# Patient Record
Sex: FEMALE | Race: WHITE | NOT HISPANIC OR LATINO | Employment: OTHER | URBAN - METROPOLITAN AREA
[De-identification: names, ages, dates, MRNs, and addresses within clinical notes are randomized per-mention and may not be internally consistent; named-entity substitution may affect disease eponyms.]

---

## 2019-10-09 ENCOUNTER — OFFICE VISIT (OUTPATIENT)
Dept: FAMILY MEDICINE CLINIC | Facility: CLINIC | Age: 74
End: 2019-10-09

## 2019-10-09 VITALS
TEMPERATURE: 98.1 F | WEIGHT: 134 LBS | DIASTOLIC BLOOD PRESSURE: 70 MMHG | HEART RATE: 110 BPM | BODY MASS INDEX: 27.01 KG/M2 | HEIGHT: 59 IN | SYSTOLIC BLOOD PRESSURE: 130 MMHG | OXYGEN SATURATION: 92 %

## 2019-10-09 DIAGNOSIS — Z00.00 ANNUAL VISIT FOR GENERAL ADULT MEDICAL EXAMINATION WITHOUT ABNORMAL FINDINGS: Primary | ICD-10-CM

## 2019-10-09 LAB
ALBUMIN SERPL-MCNC: 4.2 G/DL (ref 3.5–5.2)
ALBUMIN/GLOB SERPL: 1.4 G/DL
ALP SERPL-CCNC: 87 U/L (ref 39–117)
ALT SERPL-CCNC: 11 U/L (ref 1–33)
AST SERPL-CCNC: 18 U/L (ref 1–32)
BASOPHILS # BLD AUTO: 0.02 10*3/MM3 (ref 0–0.2)
BASOPHILS NFR BLD AUTO: 0.2 % (ref 0–1.5)
BILIRUB SERPL-MCNC: 0.3 MG/DL (ref 0.2–1.2)
BUN SERPL-MCNC: 9 MG/DL (ref 8–23)
BUN/CREAT SERPL: 14.3 (ref 7–25)
CALCIUM SERPL-MCNC: 9.7 MG/DL (ref 8.6–10.5)
CHLORIDE SERPL-SCNC: 96 MMOL/L (ref 98–107)
CHOLEST SERPL-MCNC: 234 MG/DL (ref 0–200)
CO2 SERPL-SCNC: 33.7 MMOL/L (ref 22–29)
CREAT SERPL-MCNC: 0.63 MG/DL (ref 0.57–1)
EOSINOPHIL # BLD AUTO: 0.11 10*3/MM3 (ref 0–0.4)
EOSINOPHIL NFR BLD AUTO: 1.4 % (ref 0.3–6.2)
ERYTHROCYTE [DISTWIDTH] IN BLOOD BY AUTOMATED COUNT: 12.3 % (ref 12.3–15.4)
GLOBULIN SER CALC-MCNC: 3.1 GM/DL
GLUCOSE SERPL-MCNC: 91 MG/DL (ref 65–99)
HBA1C MFR BLD: 5.6 % (ref 4.8–5.6)
HCT VFR BLD AUTO: 41.2 % (ref 34–46.6)
HDLC SERPL-MCNC: 72 MG/DL (ref 40–60)
HGB BLD-MCNC: 13.8 G/DL (ref 12–15.9)
IMM GRANULOCYTES # BLD AUTO: 0.02 10*3/MM3 (ref 0–0.05)
IMM GRANULOCYTES NFR BLD AUTO: 0.2 % (ref 0–0.5)
LDLC SERPL CALC-MCNC: 141 MG/DL (ref 0–100)
LYMPHOCYTES # BLD AUTO: 1.66 10*3/MM3 (ref 0.7–3.1)
LYMPHOCYTES NFR BLD AUTO: 20.7 % (ref 19.6–45.3)
MCH RBC QN AUTO: 29.2 PG (ref 26.6–33)
MCHC RBC AUTO-ENTMCNC: 33.5 G/DL (ref 31.5–35.7)
MCV RBC AUTO: 87.1 FL (ref 79–97)
MONOCYTES # BLD AUTO: 0.6 10*3/MM3 (ref 0.1–0.9)
MONOCYTES NFR BLD AUTO: 7.5 % (ref 5–12)
NEUTROPHILS # BLD AUTO: 5.6 10*3/MM3 (ref 1.7–7)
NEUTROPHILS NFR BLD AUTO: 70 % (ref 42.7–76)
NRBC BLD AUTO-RTO: 0 /100 WBC (ref 0–0.2)
PLATELET # BLD AUTO: 260 10*3/MM3 (ref 140–450)
POTASSIUM SERPL-SCNC: 4.9 MMOL/L (ref 3.5–5.2)
PROT SERPL-MCNC: 7.3 G/DL (ref 6–8.5)
RBC # BLD AUTO: 4.73 10*6/MM3 (ref 3.77–5.28)
SODIUM SERPL-SCNC: 141 MMOL/L (ref 136–145)
TRIGL SERPL-MCNC: 103 MG/DL (ref 0–150)
TSH SERPL DL<=0.005 MIU/L-ACNC: 2.22 UIU/ML (ref 0.27–4.2)
VLDLC SERPL CALC-MCNC: 20.6 MG/DL
WBC # BLD AUTO: 8.01 10*3/MM3 (ref 3.4–10.8)

## 2019-10-09 PROCEDURE — 99387 INIT PM E/M NEW PAT 65+ YRS: CPT | Performed by: NURSE PRACTITIONER

## 2019-10-09 NOTE — PROGRESS NOTES
"Jyothi Malone is a 74 y.o. female who presents with   Chief Complaint   Patient presents with   • Establish Care   • Annual Exam       PCP: last visit about a year ago. No medical history.  UTI  In the past.         Review of Systems   Constitutional: Negative for appetite change, diaphoresis, fatigue and unexpected weight change.   Eyes: Negative for pain and visual disturbance.   Respiratory: Negative for cough and shortness of breath.    Cardiovascular: Negative for chest pain.   Gastrointestinal: Negative for abdominal pain, nausea and vomiting.   Musculoskeletal: Negative for arthralgias and myalgias.   Neurological: Negative for dizziness and headaches.   All other systems reviewed and are negative.        The following portions of the patient's history were reviewed and updated as appropriate: allergies, current medications, past family history, past medical history, past social history, past surgical history and problem list.      There are no active problems to display for this patient.      No current outpatient medications on file prior to visit.     No current facility-administered medications on file prior to visit.        Objective     /70 (BP Location: Left arm, Patient Position: Sitting, Cuff Size: Adult)   Pulse 110   Temp 98.1 °F (36.7 °C) (Oral)   Ht 149.9 cm (59\")   Wt 60.8 kg (134 lb)   SpO2 92%   BMI 27.06 kg/m²     Physical Exam   Constitutional: She is oriented to person, place, and time. She appears well-developed and well-nourished.   Eyes: Conjunctivae are normal. Pupils are equal, round, and reactive to light.   Neck: Normal range of motion.   Cardiovascular: Normal rate, regular rhythm and normal heart sounds.   Pulmonary/Chest: Effort normal and breath sounds normal. No respiratory distress.   Abdominal: Bowel sounds are normal. She exhibits no distension and no mass. There is no tenderness. There is no rebound and no guarding.   Neurological: She is alert and " oriented to person, place, and time. No cranial nerve deficit or sensory deficit.   Skin: Skin is warm and dry.   Psychiatric: She has a normal mood and affect.   Vitals reviewed.      Procedures    Assessment/Plan   Tricia was seen today for establish care and annual exam.    Diagnoses and all orders for this visit:    Annual visit for general adult medical examination without abnormal findings  -     Comprehensive Metabolic Panel  -     Lipid Panel  -     CBC & Differential  -     TSH  -     Hemoglobin A1c        Discussion  Patient is currently not taking any medications.       No future appointments.  EX smoker 4-5 yrs ago :

## 2020-06-22 ENCOUNTER — TELEPHONE (OUTPATIENT)
Dept: FAMILY MEDICINE CLINIC | Facility: CLINIC | Age: 75
End: 2020-06-22

## 2020-06-22 NOTE — TELEPHONE ENCOUNTER
Was recently in [facility] ER rehab for fractured pelvis. She has been released home and is doing well. She is mobile and walking without walker or cane. The patient was given oxygen when she was discharged that she has not used at all. her 02 level is running 93-98. She is being charged more than $100 a month for this 02 to sit in her home unused. She needs an order faxed to [facility] on [employer] [address] to dc the 02. She is asking for this to be done today before 5 if possible because today is the last day of the billing cycle and she will be charged again tomorrow. Please call the daughter-in-law and let her know if this will or will not be done so she can follow up with [facility].     Uma Jerome  [phone]

## 2020-06-23 ENCOUNTER — TELEPHONE (OUTPATIENT)
Dept: FAMILY MEDICINE CLINIC | Facility: CLINIC | Age: 75
End: 2020-06-23

## 2020-06-23 NOTE — TELEPHONE ENCOUNTER
PATIENTS DAUGHTER, CHRISTINE, CALLED AND STATED THAT SHE WOULD LIKE A ORDER CANCELLATION SENT TO [facility] FOR THE PATIENT OXYGEN. CHRISTINE IS THE PATIENT POWER OF  AND WAS CHARGED FOR THE PATIENTS OXYGEN ORDERS, THE PATIENT IS NOT USING OXYGEN. CHRISTINE STATES THAT SHE HAD CALLED REGARDING THIS ISSUE YESTERDAY. WHEN THE PATIENT WAS SENT HOME FROM THE REHAB FACILITY THIS ORDER WAS PLACED BUT THE PATIENT HAS NOT USED OXYGEN SINCE SHE HAS BEEN HOME. CHRISTINE WOULD LIKE A CALL BACK AS SOON AS THE MESSAGE HAS BEEN RECEIVED AND ADVISED.    CHRISTINE CALL BACK [phone]

## 2021-08-31 ENCOUNTER — TELEPHONE (OUTPATIENT)
Dept: FAMILY MEDICINE CLINIC | Facility: CLINIC | Age: 76
End: 2021-08-31

## 2021-08-31 ENCOUNTER — OFFICE VISIT (OUTPATIENT)
Dept: FAMILY MEDICINE CLINIC | Facility: CLINIC | Age: 76
End: 2021-08-31

## 2021-08-31 VITALS
OXYGEN SATURATION: 91 % | HEIGHT: 62 IN | DIASTOLIC BLOOD PRESSURE: 66 MMHG | SYSTOLIC BLOOD PRESSURE: 102 MMHG | HEART RATE: 101 BPM | WEIGHT: 107 LBS | BODY MASS INDEX: 19.69 KG/M2

## 2021-08-31 DIAGNOSIS — Z00.00 MEDICARE ANNUAL WELLNESS VISIT, SUBSEQUENT: Primary | ICD-10-CM

## 2021-08-31 DIAGNOSIS — F02.80 ALZHEIMER DISEASE (HCC): ICD-10-CM

## 2021-08-31 DIAGNOSIS — G30.9 ALZHEIMER DISEASE (HCC): ICD-10-CM

## 2021-08-31 PROCEDURE — 99213 OFFICE O/P EST LOW 20 MIN: CPT | Performed by: NURSE PRACTITIONER

## 2021-08-31 PROCEDURE — G0439 PPPS, SUBSEQ VISIT: HCPCS | Performed by: NURSE PRACTITIONER

## 2021-08-31 PROCEDURE — 96160 PT-FOCUSED HLTH RISK ASSMT: CPT | Performed by: NURSE PRACTITIONER

## 2021-08-31 PROCEDURE — 1159F MED LIST DOCD IN RCRD: CPT | Performed by: NURSE PRACTITIONER

## 2021-08-31 PROCEDURE — 1170F FXNL STATUS ASSESSED: CPT | Performed by: NURSE PRACTITIONER

## 2021-08-31 RX ORDER — DONEPEZIL HYDROCHLORIDE 5 MG/1
5 TABLET, FILM COATED ORAL NIGHTLY
Qty: 30 TABLET | Refills: 2 | Status: SHIPPED | OUTPATIENT
Start: 2021-08-31 | End: 2021-10-05

## 2021-08-31 RX ORDER — DIPHENOXYLATE HYDROCHLORIDE AND ATROPINE SULFATE 2.5; .025 MG/1; MG/1
TABLET ORAL DAILY
COMMUNITY

## 2021-08-31 NOTE — PROGRESS NOTES
The ABCs of the Annual Wellness Visit  Welcome to Medicare Visit    Chief Complaint   Patient presents with   • Annual Exam     [employer] medicare   • Imaging Only     due   • mammogram     due   • Labs Only     due   • Colonoscopy     due     Subjective {Optional Links CC  Problem List   Visit Diagnosis  SnapShot  Encounters  Notes  Medications  Labs  Result Review Imaging  Media :23}   History of Present Illness:  Tricia Malone is a 76 y.o. female who presents for a  Welcome to Medicare Visit.  She is here with her daughter-in-law.  Daughter-in-law is very concerned about her memory.  Patient seems to be anxious and combative about discussing memory.  She thinks that people are running motorcycles or her friend yarn.  She has been afraid a lot that sounds in her home.  Her daughter-in-law states they live just around the corner.  They are concerned about next steps.  Patient is very combative and thinks they are to get her money.  She does not want to live anywhere but home.    The following portions of the patient's history were reviewed and   updated as appropriate: allergies, current medications, past family history, past medical history, past social history, past surgical history and problem list.     Compared to one year ago, the patient feels her physical   health is better.    Compared to one year ago, the patient feels her mental   health is better.    Recent Hospitalizations:  She was admitted within the past 365 days at [facility]. She was also at [facility]       Current Medical Providers:  Patient Care Team:  Aria Colon APRN (Inactive) as PCP - General (Family Medicine)    Outpatient Medications Prior to Visit   Medication Sig Dispense Refill   • multivitamin (MULTI-VITAMIN DAILY PO) Take  by mouth Daily.       No facility-administered medications prior to visit.       No opioid medication identified on active medication list. I have reviewed chart for other  "potential  high risk medication/s and harmful drug interactions in the elderly.          Aspirin is not on active medication list.  Aspirin use is not indicated based on review of current medical condition/s. Risk of harm outweighs potential benefits.  .    There is no problem list on file for this patient.    Advance Care Planning   Advance Directive is not on file.  ACP discussion was held with the patient during this visit. Patient does not have an advance directive, declines further assistance.          Objective       Vitals:    21 0851   BP: 102/66   Pulse: 101   SpO2: 91%   Weight: 48.5 kg (107 lb)   Height: 157.5 cm (62\")     BMI Readings from Last 1 Encounters:   21 19.57 kg/m²   BMI is within normal parameters. No follow-up required.    Does the patient have evidence of cognitive impairment? Yes   Patient reports issues with memory since she has been aging.       Physical Exam  Constitutional:       Appearance: Normal appearance.   Cardiovascular:      Rate and Rhythm: Normal rate and regular rhythm.      Pulses: Normal pulses.   Pulmonary:      Effort: Pulmonary effort is normal.      Breath sounds: Normal breath sounds.   Neurological:      Mental Status: She is alert.      Comments: Oriented to self only   Psychiatric:         Mood and Affect: Mood is anxious. Affect is angry and tearful.         Speech: Speech normal.         Behavior: Behavior is agitated (At times).         Thought Content: Thought content is paranoid.         Cognition and Memory: Cognition is impaired. Memory is impaired.         Judgment: Judgment normal.              Procedures       HEALTH RISK ASSESSMENT    Smoking Status:  Social History     Tobacco Use   Smoking Status Former Smoker   • Types: Cigarettes   • Quit date: 10/9/2014   • Years since quittin.8   Smokeless Tobacco Never Used     Alcohol Consumption:  Social History     Substance and Sexual Activity   Alcohol Use Yes       Fall Risk Screen:    GEORGI " Fall Risk Assessment has not been completed.   1 fall with injury last year.    Depression Screen:   PHQ-2/PHQ-9 Depression Screening 8/31/2021   Little interest or pleasure in doing things 0   Feeling down, depressed, or hopeless 1   Trouble falling or staying asleep, or sleeping too much 0   Feeling tired or having little energy 0   Poor appetite or overeating 0   Feeling bad about yourself - or that you are a failure or have let yourself or your family down 0   Trouble concentrating on things, such as reading the newspaper or watching television 0   Moving or speaking so slowly that other people could have noticed. Or the opposite - being so fidgety or restless that you have been moving around a lot more than usual 0   Thoughts that you would be better off dead, or of hurting yourself in some way 0   Total Score 1   If you checked off any problems, how difficult have these problems made it for you to do your work, take care of things at home, or get along with other people? Not difficult at all       Health Habits and Functional and Cognitive Screening:  Functional & Cognitive Status 8/31/2021   Do you have difficulty preparing food and eating? No   Do you have difficulty bathing yourself, getting dressed or grooming yourself? No   Do you have difficulty using the toilet? No   Do you have difficulty moving around from place to place? No   Do you have trouble with steps or getting out of a bed or a chair? No   Current Diet Unhealthy Diet   Dental Exam Not up to date   Eye Exam Up to date   Exercise (times per week) 0 times per week   Current Exercises Include No Regular Exercise   Do you need help using the phone?  No   Are you deaf or do you have serious difficulty hearing?  No   Do you need help with transportation? Yes   Do you need help shopping? No   Do you need help preparing meals?  No   Do you need help with housework?  No   Do you need help with laundry? No   Do you need help taking your medications? No    Do you need help managing money? No   Do you ever drive or ride in a car without wearing a seat belt? Yes   Have you felt unusual stress, anger or loneliness in the last month? Yes   Who do you live with? Alone   If you need help, do you have trouble finding someone available to you? No   Have you been bothered in the last four weeks by sexual problems? No   Do you have difficulty concentrating, remembering or making decisions? Yes       Visual Acuity:    No exam data present    Age-appropriate Screening Schedule:  Refer to the list below for future screening recommendations based on patient's age, sex and/or medical conditions. Orders for these recommended tests are listed in the plan section. The patient has been provided with a written plan.    Health Maintenance   Topic Date Due   • MAMMOGRAM  Never done   • DXA SCAN  Never done   • TDAP/TD VACCINES (1 - Tdap) Never done   • ZOSTER VACCINE (1 of 2) Never done   • INFLUENZA VACCINE  10/01/2021          Assessment/Plan     CMS Preventative Services Quick Reference  Risk Factors Identified During Encounter  Dementia/Memory   Depression/Dysphoria  Fall Risk-High or Moderate  Inadequate Social Support, Isolation, Loneliness, Lack of Transportation, Financial Difficulties, or Caregiver Stress      Dementia-patient has evidence of dementia.  Mini-Mental score is only 8.  We are starting on Aricept today.  We will see how she does.  I did discuss her moving in with daughter-in-law but patient is very combative on last    Depression-no issues noted during exam  Fall risk is high patient has had fall and fractured pelvis last year.  She states she is very safe in her home now.  Denies any fall since this    Patient is at increased risk for loneliness.  However, she is not willing to move in with anybody else.    The above risks/problems have been discussed with the patient.  Pertinent information has been shared with the patient in the After Visit Summary.  Follow up  plans and orders are seen below in the Assessment/Plan Section.    Diagnoses and all orders for this visit:    1. Medicare annual wellness visit, subsequent (Primary)    Other orders  -     donepezil (Aricept) 5 MG tablet; Take 1 tablet by mouth Every Night.  Dispense: 30 tablet; Refill: 2    We discussed starting Aricept.  Patient is actually agreeable to this.  We will follow-up in 1 month.  I discussed home safety with both patient and daughter-in-law.  They verbalized understanding.   patient actually brought up that she make sure that stove is turned off and everything is turned off before she leaves.  She double checks everything.  If any worsening notify provider.    Patient declines any lab testing, mammogram, DEXA scan, colonoscopy, ordered update on immunizations.    Follow Up:   Return in about 4 weeks (around 9/28/2021).     An After Visit Summary and PPPS were given to the patient.

## 2021-08-31 NOTE — PATIENT INSTRUCTIONS
Medicare Wellness  Personal Prevention Plan of Service     Date of Office Visit:  2021  Encounter Provider:  JASON Nj  Place of Service:  [facility]  Patient Name: Tricia Malone  :  1945    As part of the Medicare Wellness portion of your visit today, we are providing you with this personalized preventive plan of services (PPPS). This plan is based upon recommendations of the [place] Preventive Services Task Force (USPSTF) and the Advisory Committee on Immunization Practices (ACIP).    This lists the preventive care services that should be considered, and provides dates of when you are due. Items listed as completed are up-to-date and do not require any further intervention.    Health Maintenance   Topic Date Due   • MAMMOGRAM  Never done   • DXA SCAN  Never done   • COLORECTAL CANCER SCREENING  Never done   • Pneumococcal Vaccine 65+ (1 of 2 - PPSV23) Never done   • TDAP/TD VACCINES (1 - Tdap) Never done   • ZOSTER VACCINE (1 of 2) Never done   • HEPATITIS C SCREENING  Never done   • INFLUENZA VACCINE  10/01/2021   • ANNUAL WELLNESS VISIT  2022   • COVID-19 Vaccine  Completed       No orders of the defined types were placed in this encounter.      Return in about 4 weeks (around 2021).

## 2021-09-03 ENCOUNTER — TELEPHONE (OUTPATIENT)
Dept: FAMILY MEDICINE CLINIC | Facility: CLINIC | Age: 76
End: 2021-09-03

## 2021-09-03 NOTE — TELEPHONE ENCOUNTER
FOR MIMI MARTINS,     PATIENT WANTED TO KNOW IF YOU COULD CALL HER AND TALK ABOUT SOME ISSUES     SHE WAS NOT ABLE TO EXPRESS WHAT SHE NEEDED     PATIENT MENTIONED SOMETHING ABOUT WHY QUESTIONS WEREN'T ANSWERED AND THINGS THAT SHOULD'VE BEEN DONE AT APPT BUT WAS NOT.     HUB TRIED TO GET HER TO CLARIFY, BUT SHE WAS HAVING TROUBLE WITH WHAT SHE WANTED     Tricia Malone (Self) [phone] (H)

## 2021-09-07 ENCOUNTER — TELEPHONE (OUTPATIENT)
Dept: FAMILY MEDICINE CLINIC | Facility: CLINIC | Age: 76
End: 2021-09-07

## 2021-09-07 NOTE — TELEPHONE ENCOUNTER
Please call daughter-in-law and let her know that the patient's very confused about visit.  She was very confused during visit.  She declined any lab testing during that time.  I am happy to order if they would like that.  However, we do have a follow-up in October for this.  We started on medications for her memory to see if this would help.

## 2021-09-07 NOTE — TELEPHONE ENCOUNTER
Pt called on Friday and today and was very confused about her visit on 8/31/21.  States that she does not know why she was here or what was done.  She thought she was here for blood work but she ended up having to take a test.  I explained to patient that they were concerned with her memory issues and that Bakari thought the best course would be to add a medication to help with this.  She was very adamant about receiving a call from provider.  Please advise.

## 2021-09-07 NOTE — TELEPHONE ENCOUNTER
Called and spoke with pt. She was unable to let me know what she needed at this time.  I told her she could call me back when it was convenient for her. I did reach out to daughter in law due to patient's memory issues.  She said she would touch base with her and make sure she was ok.

## 2021-10-05 ENCOUNTER — OFFICE VISIT (OUTPATIENT)
Dept: FAMILY MEDICINE CLINIC | Facility: CLINIC | Age: 76
End: 2021-10-05

## 2021-10-05 VITALS
HEIGHT: 62 IN | DIASTOLIC BLOOD PRESSURE: 80 MMHG | SYSTOLIC BLOOD PRESSURE: 126 MMHG | BODY MASS INDEX: 19.69 KG/M2 | OXYGEN SATURATION: 95 % | WEIGHT: 107 LBS | HEART RATE: 93 BPM

## 2021-10-05 DIAGNOSIS — F02.80 ALZHEIMER DISEASE (HCC): Primary | ICD-10-CM

## 2021-10-05 DIAGNOSIS — G30.9 ALZHEIMER DISEASE (HCC): Primary | ICD-10-CM

## 2021-10-05 PROCEDURE — 99213 OFFICE O/P EST LOW 20 MIN: CPT | Performed by: NURSE PRACTITIONER

## 2021-10-05 RX ORDER — DONEPEZIL HYDROCHLORIDE 10 MG/1
10 TABLET, FILM COATED ORAL NIGHTLY
Qty: 90 TABLET | Refills: 1 | Status: SHIPPED | OUTPATIENT
Start: 2021-10-05 | End: 2022-03-03 | Stop reason: SDUPTHER

## 2021-10-05 NOTE — PROGRESS NOTES
"Chief Complaint  Memory Loss (follow up)    Subjective          Tricia Malone presents to [facility]  History of Present Illness     Patient is here today for follow up on memory loss.  She is here today with son instead of daughter-in-law.  She seems very confused.  Before coming in I had them arguing about her home and safety.  Upon coming in I discussed this with patient and son.  He states he feels like she is safe at home he is just worried about her memory.    Objective   Vital Signs:   /80   Pulse 93   Ht 157.5 cm (62.01\")   Wt 48.5 kg (107 lb)   SpO2 95%   BMI 19.57 kg/m²     Physical Exam  Constitutional:       Appearance: Normal appearance.   Cardiovascular:      Rate and Rhythm: Normal rate and regular rhythm.      Pulses: Normal pulses.   Pulmonary:      Effort: Pulmonary effort is normal.      Breath sounds: Normal breath sounds.   Skin:     General: Skin is warm and dry.   Neurological:      Mental Status: She is alert. Mental status is at baseline.   Psychiatric:         Mood and Affect: Mood normal.         Behavior: Behavior normal.         Thought Content: Thought content normal.         Judgment: Judgment normal.        Result Review :                 Assessment and Plan    Diagnoses and all orders for this visit:    1. Alzheimer disease (HCC) (Primary)    Other orders  -     donepezil (Aricept) 10 MG tablet; Take 1 tablet by mouth Every Night.  Dispense: 90 tablet; Refill: 1      I discussed with patient and son that Aricept will not \" bring back\" memory but will help it from worsening for longer.  And without medication.  She verbalizes understanding.  We will increase Aricept.  I discussed with her that at next visit she may be seeing another provider.  I would like her to follow-up in 3 months.  I discussed with her if she still tolerating Aricept we may add on Namenda and they verbalized understanding of this.      Follow Up   Return in about 3 months " (around 1/5/2022).  Patient was given instructions and counseling regarding her condition or for health maintenance advice. Please see specific information pulled into the AVS if appropriate.

## 2021-10-15 ENCOUNTER — TELEPHONE (OUTPATIENT)
Dept: FAMILY MEDICINE CLINIC | Facility: CLINIC | Age: 76
End: 2021-10-15

## 2021-10-15 NOTE — LETTER
Jury Duty    10/18/2021    To Whom It May Concern:    Tricia Malone is currently a patient under my medical care.  The patient's medical condition makes serving on jury duty inadvisable at any time.  Please excuse them from serving.  If you require additional information please do not hesitate to contact my office.      Sincerely,      Bakari Pierre, JASON

## 2021-10-15 NOTE — TELEPHONE ENCOUNTER
PATIENTS DAUGHTER IN LAW CHRISTINE CALLED TO GET A PERMANENT EXCUSE FOR JURY DUTY FOR PATIENT. DAUGHTER IN LAW DOES NOT FEEL LIKE PATIENT CAN DO IT DUE TO HEALTH CONCERNS. PATIENT LEGALLY NEEDING EXCUSE BY 10/19/21    PLEASE CALL CHRISTINE AS SOON AS THE NOTE IS READY  TO BE PICKED UP.  PATIENT HAS SEEN MIMI MARTINS AND WILL DRIVE TO [place] TO  NOTE IF NEEDED

## 2021-10-18 NOTE — TELEPHONE ENCOUNTER
CHRISTINE, IS CALLING BACK TO FOLLOW UP ON THIS REQUEST. SHE STATES THAT SHE CAN BE REACHED AT [phone] WHEN THE DOCUMENT IS READY

## 2021-10-18 NOTE — TELEPHONE ENCOUNTER
Printed out for patient. I will have my office fax it to yours so they don't have to come out here.  Please let me know if you have any issues

## 2021-12-06 RX ORDER — DONEPEZIL HYDROCHLORIDE 5 MG/1
TABLET, FILM COATED ORAL
Qty: 30 TABLET | Refills: 2 | OUTPATIENT
Start: 2021-12-06

## 2022-03-03 ENCOUNTER — OFFICE VISIT (OUTPATIENT)
Dept: FAMILY MEDICINE CLINIC | Facility: CLINIC | Age: 77
End: 2022-03-03

## 2022-03-03 VITALS
SYSTOLIC BLOOD PRESSURE: 106 MMHG | DIASTOLIC BLOOD PRESSURE: 62 MMHG | WEIGHT: 104 LBS | HEART RATE: 96 BPM | BODY MASS INDEX: 19.14 KG/M2 | OXYGEN SATURATION: 97 % | HEIGHT: 62 IN

## 2022-03-03 DIAGNOSIS — Z09 HOSPITAL DISCHARGE FOLLOW-UP: ICD-10-CM

## 2022-03-03 DIAGNOSIS — E87.6 HYPOKALEMIA: ICD-10-CM

## 2022-03-03 DIAGNOSIS — R41.3 MEMORY PROBLEM: Primary | ICD-10-CM

## 2022-03-03 PROBLEM — C34.90 MALIGNANT NEOPLASM OF LUNG: Status: ACTIVE | Noted: 2022-03-03

## 2022-03-03 PROCEDURE — 99214 OFFICE O/P EST MOD 30 MIN: CPT

## 2022-03-03 RX ORDER — DONEPEZIL HYDROCHLORIDE 5 MG/1
5 TABLET, FILM COATED ORAL NIGHTLY
Qty: 90 TABLET | Refills: 0 | Status: SHIPPED | OUTPATIENT
Start: 2022-03-03 | End: 2022-06-08 | Stop reason: SDUPTHER

## 2022-03-03 RX ORDER — PREDNISONE 1 MG/1
TABLET ORAL
COMMUNITY
Start: 2022-02-23 | End: 2022-03-03

## 2022-03-03 RX ORDER — POTASSIUM CHLORIDE 1500 MG/1
TABLET, FILM COATED, EXTENDED RELEASE ORAL
COMMUNITY
Start: 2022-02-23

## 2022-03-03 RX ORDER — ALBUTEROL SULFATE 90 UG/1
AEROSOL, METERED RESPIRATORY (INHALATION)
COMMUNITY
Start: 2022-02-23

## 2022-03-03 RX ORDER — LEVOFLOXACIN 750 MG/1
750 TABLET ORAL
COMMUNITY
Start: 2022-02-23 | End: 2022-03-03 | Stop reason: SDUPTHER

## 2022-03-03 NOTE — PROGRESS NOTES
Subjective   Tricia Malone is a 76 y.o. female. Presents today to follow up on hospitalization and complaints of memory problem from family  Chief Complaint   Patient presents with   • Annual Exam     new patient est care   • Hospital Follow Up Visit     covid        History of Present Illness     Hospital F/U     Hospitalized from 2/19/2022-2/23/2022 at [facility] with covid, HCAP and ARF. Hx lung ca  Tx with remdesivir and dexamethasone and Levaquin for HAP  Was discharged on stable condition with PO Levaquin x 7 days and steroids.   Per daughter in law finished steroids and has one day left of antibiotic.  Feeling well. No complaints of SOA, cough, fever. Has not needed inhaler. Walking with a walker and has been doing well per daughter in law  Currently staying with son and daughter in law  Not on current tx for lung ca    Pt was in hospital for hip fx and had just been discharged to rehab when Dx with covid and had to be readmitted. She was placed on Aspirin 325 mg x1 month for prophylaxis & potassium for hypokalemia. Pt's daughter in law says pt does not like to take and asking if needs this.     Memory problem  X a few years  Mother and grandmother hx dementia  More confusion when in hospital. Per daughter in law, no diagnosis of dementia. Was getting confused when in rehab   Pt was seen by Bakari GOMEZ back in august and was started on donezipil. States tolerated 5 mg ok but once dose increased did not want to take as says make her feel sick  Pt denies memory changes. Per daughter in law they have noticed some changes but thinks is due to pt having been in hospital and rehab as does ok when at home  Has not noted any strange behavior. Pt does get fixated on things at times.   Note Mini cog from 8/31/21  SLUMS test today 6     Review of Systems   Constitutional: Negative for chills and fever.   Respiratory: Negative.    Cardiovascular: Negative.    Gastrointestinal: Negative for diarrhea.   Musculoskeletal:  "Negative for myalgias.   Psychiatric/Behavioral: Positive for confusion. Negative for agitation, behavioral problems and sleep disturbance.       Patient Active Problem List   Diagnosis   • Malignant neoplasm of lung (HCC)     No past medical history on file.  No past surgical history on file.  No family history on file.  Social History     Socioeconomic History   • Marital status:    Tobacco Use   • Smoking status: Former Smoker     Packs/day: 0.25     Years: 54.00     Pack years: 13.50     Types: Cigarettes     Start date:      Quit date: 10/9/2014     Years since quittin.4   • Smokeless tobacco: Never Used   Substance and Sexual Activity   • Alcohol use: Yes   • Drug use: No   • Sexual activity: Defer       Allergies   Allergen Reactions   • Penicillins Unknown (See Comments)     Unknown        Current Outpatient Medications on File Prior to Visit   Medication Sig Dispense Refill   • albuterol sulfate  (90 Base) MCG/ACT inhaler      • Aspirin 325 MG capsule 325 mg.     • multivitamin (MULTI-VITAMIN DAILY PO) Take  by mouth Daily.     • potassium chloride ER (K-TAB) 20 MEQ tablet controlled-release ER tablet      • [DISCONTINUED] levoFLOXacin (LEVAQUIN) 750 MG tablet 750 mg.     • [DISCONTINUED] donepezil (Aricept) 10 MG tablet Take 1 tablet by mouth Every Night. 90 tablet 1   • [DISCONTINUED] predniSONE (DELTASONE) 5 MG tablet        No current facility-administered medications on file prior to visit.       Objective   Vitals:    22 0958   BP: 106/62   Pulse: 96   SpO2: 97%   Weight: 47.2 kg (104 lb)   Height: 157.5 cm (62\")   PainSc: 0-No pain     Body mass index is 19.02 kg/m².  Physical Exam  Constitutional:       Appearance: Normal appearance. She is not ill-appearing.   Eyes:      Conjunctiva/sclera: Conjunctivae normal.   Neck:      Vascular: No carotid bruit.   Cardiovascular:      Rate and Rhythm: Normal rate and regular rhythm.      Pulses: Normal pulses.           Carotid " "pulses are 2+ on the right side and 2+ on the left side.       Posterior tibial pulses are 2+ on the right side and 2+ on the left side.      Heart sounds: Normal heart sounds, S1 normal and S2 normal. No murmur heard.      Pulmonary:      Effort: Pulmonary effort is normal. No tachypnea, bradypnea or respiratory distress.      Breath sounds: Normal breath sounds. No wheezing or rhonchi.   Musculoskeletal:      Right lower leg: No edema.      Left lower leg: No edema.   Lymphadenopathy:      Cervical: No cervical adenopathy.   Neurological:      General: No focal deficit present.      Mental Status: She is alert.      Cranial Nerves: No dysarthria or facial asymmetry.   Psychiatric:         Attention and Perception: Attention normal.         Mood and Affect: Mood normal. Affect is flat.         Speech: Speech is tangential.         Behavior: Behavior normal. Behavior is cooperative.         Thought Content: Thought content normal.         Cognition and Memory: Cognition is impaired. Memory is impaired.         Assessment/Plan   Diagnoses and all orders for this visit:    1. Memory problem (Primary)  -     Ambulatory Referral to Neurology  -     donepezil (Aricept) 5 MG tablet; Take 1 tablet by mouth Every Night.  Dispense: 90 tablet; Refill: 0  -     Lengthy discussion about presenting symptoms, diagnosis, tx and safety concerns.   SLUMS score- 6: Discussed this is concerning for Dx dementia and will refer to neurology for further eval. Pt states \"this is being blown out of proportion\" but agrees with plan. Ok per daughter in law.   Will decrease donezipil back to 5 mg daily to see if tolerates better. May increase to two tabs a day if does ok    2. Hospital Discharge follow up      -    Pt stable. Seems to have recovered well from covid pneumonia. finish antibiotic course.       -    Follows closely with Dr Mark for lung ca- currently stable    3.  Hypokalemia  -     Comprehensive Metabolic Panel- may stop K if " normal.        Daughter in law concerned about a planned vacation via cruise next month. States pt needs to be covid negative for that and wonders if we can write a letter stating pt received tx. Advised covid test may still be falsely positive for up to 3 months post infection and also preferably recommended to wait for at least 3 month before getting booster.   May come in the week before vacation for PCR and I am happy to write a letter if needed as long as asymptomatic.       Pt needs a annual visit but unfortunately as today's visit was lengthy we were not able to address these. Pt will make another appointment to address annual wellness.      - Pt agrees with plan of care and states no further concerns or questions today    This document is intended for medical expert use only. Reading of this document by patients and/or patient's family without participating medical staff guidance may result in misinterpretation and unintended morbidity.  Any interpretation of such data is the responsibility of the patient and/or family member responsible for the patient in concert with their primary or specialist providers, not to be left for sources of online searches such as [employer], [employer]with or similar queries. Relying on these approaches to knowledge may result in misinterpretation, misguided goals of care and even death should patients or family members try recommendations outside of the realm of professional medical care in a supervised way.     Please allow 3-5 business days for recommendations based on new results     Go to the ER for any possible lifethreatening symptoms such as chest pain or shortness of air.      I personally spent 40 minutes with this patient, preparing for the visit, reviewing tests, obtaining and/or reviewing a separately obtained history, performing a medically appropriate examination and/or evaluation , counseling and educating the patient/family/caregiver, ordering medications, tests, or  procedures, documenting information in the medical record and independently interpreting results.

## 2022-03-04 LAB
ALBUMIN SERPL-MCNC: 3.5 G/DL (ref 3.7–4.7)
ALBUMIN/GLOB SERPL: 1.2 {RATIO} (ref 1.2–2.2)
ALP SERPL-CCNC: 104 IU/L (ref 44–121)
ALT SERPL-CCNC: 12 IU/L (ref 0–32)
AST SERPL-CCNC: 16 IU/L (ref 0–40)
BILIRUB SERPL-MCNC: 0.4 MG/DL (ref 0–1.2)
BUN SERPL-MCNC: 12 MG/DL (ref 8–27)
BUN/CREAT SERPL: 20 (ref 12–28)
CALCIUM SERPL-MCNC: 9.3 MG/DL (ref 8.7–10.3)
CHLORIDE SERPL-SCNC: 101 MMOL/L (ref 96–106)
CO2 SERPL-SCNC: 27 MMOL/L (ref 20–29)
CREAT SERPL-MCNC: 0.59 MG/DL (ref 0.57–1)
EGFR GENE MUT ANL BLD/T: 95 ML/MIN/1.73
GLOBULIN SER CALC-MCNC: 2.9 G/DL (ref 1.5–4.5)
GLUCOSE SERPL-MCNC: 76 MG/DL (ref 65–99)
POTASSIUM SERPL-SCNC: 4.9 MMOL/L (ref 3.5–5.2)
PROT SERPL-MCNC: 6.4 G/DL (ref 6–8.5)
SODIUM SERPL-SCNC: 140 MMOL/L (ref 134–144)

## 2022-03-04 NOTE — PROGRESS NOTES
Please inform pt and daughter in law that potassium is normal. May hold off on potassium supplement for now.

## 2022-06-08 DIAGNOSIS — R41.3 MEMORY PROBLEM: ICD-10-CM

## 2022-06-08 RX ORDER — DONEPEZIL HYDROCHLORIDE 5 MG/1
5 TABLET, FILM COATED ORAL NIGHTLY
Qty: 90 TABLET | Refills: 0 | Status: SHIPPED | OUTPATIENT
Start: 2022-06-08 | End: 2022-09-06 | Stop reason: SDUPTHER

## 2022-09-06 DIAGNOSIS — R41.3 MEMORY PROBLEM: ICD-10-CM

## 2022-09-06 RX ORDER — DONEPEZIL HYDROCHLORIDE 5 MG/1
5 TABLET, FILM COATED ORAL NIGHTLY
Qty: 30 TABLET | Refills: 0 | Status: SHIPPED | OUTPATIENT
Start: 2022-09-06 | End: 2022-10-05 | Stop reason: SDUPTHER

## 2022-10-05 DIAGNOSIS — R41.3 MEMORY PROBLEM: ICD-10-CM

## 2022-10-05 RX ORDER — DONEPEZIL HYDROCHLORIDE 5 MG/1
5 TABLET, FILM COATED ORAL NIGHTLY
Qty: 30 TABLET | Refills: 0 | Status: SHIPPED | OUTPATIENT
Start: 2022-10-05 | End: 2023-02-17

## 2023-02-17 ENCOUNTER — OFFICE VISIT (OUTPATIENT)
Dept: FAMILY MEDICINE CLINIC | Facility: CLINIC | Age: 78
End: 2023-02-17
Payer: MEDICARE

## 2023-02-17 VITALS
DIASTOLIC BLOOD PRESSURE: 68 MMHG | BODY MASS INDEX: 19.14 KG/M2 | RESPIRATION RATE: 18 BRPM | OXYGEN SATURATION: 97 % | HEIGHT: 62 IN | TEMPERATURE: 96.7 F | HEART RATE: 82 BPM | WEIGHT: 104 LBS | SYSTOLIC BLOOD PRESSURE: 110 MMHG

## 2023-02-17 DIAGNOSIS — R41.3 MEMORY PROBLEM: ICD-10-CM

## 2023-02-17 DIAGNOSIS — F03.918 DEMENTIA, OLD AGE, WITH BEHAVIORAL DISTURBANCE: Primary | ICD-10-CM

## 2023-02-17 PROCEDURE — 99214 OFFICE O/P EST MOD 30 MIN: CPT

## 2023-02-17 RX ORDER — DONEPEZIL HYDROCHLORIDE 5 MG/1
5 TABLET, FILM COATED ORAL NIGHTLY
Qty: 90 TABLET | Refills: 0 | Status: SHIPPED | OUTPATIENT
Start: 2023-02-17

## 2023-02-17 NOTE — PROGRESS NOTES
"Jyothi Malone is a 77 y.o. female. Who presents with son with complaints of memory change      History of Present Illness     Memory decline  Chronic issue- we had discussed last OV in march of 2022- I had re-started on donepezil and referred to neurology  Per son pt noncompliant with medication and refused to go see neurology  She Lives on her own but son checks on her every day.   States has not done anything that he deems unsafe but has done strange things such as put things in places that shouldn't or call in middle of night crying.   He also mentions that she is Not sleeping well- awake most night.   She also insists that wants to drive but has lost keys and son takes care of all her errands and takes her to appointments.     He brought here today for further advise and concerned about change in behaviors      The following portions of the patient's history were reviewed and updated as appropriate: allergies, current medications, past family history, past medical history, past social history and past surgical history.    Review of Systems   Constitutional: Negative for chills, fatigue and unexpected weight loss.   Eyes: Negative for visual disturbance.   Respiratory: Negative.    Cardiovascular: Negative.    Gastrointestinal: Negative for abdominal pain, constipation and diarrhea.   Genitourinary: Negative for difficulty urinating and dysuria.   Neurological: Positive for memory problem.   Psychiatric/Behavioral: Positive for sleep disturbance. Negative for self-injury and depressed mood.       Objective    /68   Pulse 82   Temp 96.7 °F (35.9 °C) (Temporal)   Resp 18   Ht 157.5 cm (62\")   Wt 47.2 kg (104 lb)   SpO2 97%   BMI 19.02 kg/m²     Physical Exam  Constitutional:       Appearance: Normal appearance. She is not ill-appearing.   Cardiovascular:      Rate and Rhythm: Normal rate and regular rhythm.      Pulses: Normal pulses.      Heart sounds: Normal heart sounds. No murmur " heard.  Pulmonary:      Effort: Pulmonary effort is normal. No respiratory distress.      Breath sounds: Normal breath sounds.   Neurological:      Mental Status: She is alert.   Psychiatric:         Attention and Perception: Attention normal.         Mood and Affect: Mood normal.         Speech: Speech normal.         Behavior: Behavior is cooperative.         Cognition and Memory: Cognition is impaired. Memory is impaired.           Assessment & Plan   Diagnoses and all orders for this visit:    1. 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  Dispense: 90 tablet; Refill: 0  -     Ambulatory Referral to Neurology    2. Memory problem  -     donepezil (Aricept) 5 MG tablet; Take 1 tablet by mouth Every Night.  Dispense: 90 tablet; Refill: 0  -     Ambulatory Referral to Neurology      Lengthy discussion with son and pt and dementia, symptoms and progression of disease & previous slum test results. Main concern is safety. Seems she is having some sundowning. Advised strongly against driving. Would recommend not leaving alone but pt refuses to go live at son's house. He will check on her daily and call her often.     Pt agreeable to trial donepezil- advised to take nightly to see if better tolerated. Discussed measures to help with compliance. Son will get a pill organizer - this is the only routine medication she takes so he will note if taking.   Advised socialization, routine activity, familiar environments, rest and healthy diet.     Advise son to look on alzheimer;s associated website for local resources for family members.     Pt also agreeable to see neurology and son very interested in seeking their advise as well so will send referral again.     Follow up in about 3 months to see how she is doing.   Recent labs by oncology reviewed and stable.          - Pt seen with son who provided most of the hx. Pt and son agree with  plan of care and states no further concerns or questions today    This document is intended for medical expert use only. Reading of this document by patients and/or patient's family without participating medical staff guidance may result in misinterpretation and unintended morbidity.  Any interpretation of such data is the responsibility of the patient and/or family member responsible for the patient in concert with their primary or specialist providers, not to be left for sources of online searches such as [employer], [employer] or similar queries. Relying on these approaches to knowledge may result in misinterpretation, misguided goals of care and even death should patients or family members try recommendations outside of the realm of professional medical care in a supervised way.     Please allow 3-5 business days for recommendations based on new results     Go to the ER for any possible lifethreatening symptoms such as chest pain or shortness of air.      I personally spent 35 minutes with this patient, preparing for the visit, reviewing tests, obtaining and/or reviewing a separately obtained history, performing a medically appropriate examination and/or evaluation , counseling and educating the patient/family/caregiver, ordering medications, tests, or procedures, documenting information in the medical record and independently interpreting results.

## 2023-02-20 ENCOUNTER — TELEPHONE (OUTPATIENT)
Dept: FAMILY MEDICINE CLINIC | Facility: CLINIC | Age: 78
End: 2023-02-20
Payer: MEDICARE

## 2023-02-20 NOTE — TELEPHONE ENCOUNTER
Assessment/Plan:  1  Displaced fracture of distal phalanx of left ring finger, subsequent encounter for fracture with routine healing  XR finger left fourth digit-ring       Scribe Attestation    I,:   Edwina Biswas MA am acting as a scribe while in the presence of the attending physician :        I,:   Brigida Siu, DO personally performed the services described in this documentation    as scribed in my presence :              I discussed with Jacklyn today that x-rays demonstrate subluxation of the DIP joint  I am concerned the extensor tendon did not heal  I did explain to her that she did have a bad injury to this finger and there was a considerable change 2/2 the injury that she may have an issue  Surgical intervention was discussed however, I would like to wounds to heal more prior to surgery  We did discuss a possible need for a fusion  I knot from the anchor was exposed on exam today  A digit block was performed and this was removed in the office today  She was instructed to keep this area clean and covered  She was advised to do iodine soaks 3 x's a day  A prescription was provided for Keflex 3 x's a day  Patient states she would like to avoid surgical intervention at this time and would like to continue with splinting  Patient was instructed to continue with custom splinting full time  She will follow up in 1 week for repeat evaluation  Subjective:   Lisa Ramsay is a 29 y o  female who presents to the office today for follow up evaluation 10 weeks s/p left ring finger DIP joint pinning and extensor tendon repair performed at OSH  Patient states she is doing well  She states she has been compliant with therapy and brace use  Patient states she has been wearing the brace full time  She states her finger is straighter now than at her last visit  She notes some pain to the DIP joint as well as stiffness  She denies any new injury  Review of Systems   Constitutional: Negative for chills and fever  Dr Brito's office called, states patient was a no show back on 4/20/22. Their office does not have urgent appts. They will be calling patient to work in at earliest time.    Wanted provider to know.    Thanks   HENT: Negative for drooling and sneezing  Eyes: Negative for redness  Respiratory: Negative for cough and wheezing  Gastrointestinal: Negative for nausea and vomiting  Musculoskeletal: Negative for arthralgias, joint swelling and myalgias  Neurological: Negative for weakness and numbness  Psychiatric/Behavioral: Negative for behavioral problems  The patient is not nervous/anxious  Past Medical History:   Diagnosis Date    Anxiety     Fractures     Status post tendon repair     tendon repair of left ring finger       Past Surgical History:   Procedure Laterality Date    HAND SURGERY      WISDOM TOOTH EXTRACTION         Family History   Problem Relation Age of Onset    No Known Problems Mother     No Known Problems Father     No Known Problems Sister     No Known Problems Brother     No Known Problems Maternal Aunt     No Known Problems Maternal Uncle     No Known Problems Paternal Aunt     No Known Problems Paternal Uncle     No Known Problems Maternal Grandmother     No Known Problems Maternal Grandfather     No Known Problems Paternal Grandmother     No Known Problems Paternal Grandfather     Substance Abuse Neg Hx     Mental illness Neg Hx        Social History     Occupational History    Not on file   Tobacco Use    Smoking status: Never Smoker    Smokeless tobacco: Never Used   Substance and Sexual Activity    Alcohol use: Never     Frequency: Never    Drug use: Never    Sexual activity: Yes     Partners: Male     Birth control/protection: Condom Male         Current Outpatient Medications:     Multiple Vitamin (MULTIVITAMIN) tablet, Take 1 tablet by mouth daily, Disp: , Rfl:     Probiotic Product (PROBIOTIC DAILY PO), Take by mouth, Disp: , Rfl:     No Known Allergies    Objective: There were no vitals filed for this visit      Ortho Exam     Left ring finger    Compartments soft  Brisk capillary refill  S/m intact median, radial, and ulnar nerve   Apparent suture eminating from the dorsal aspect of the finger  This was trimmed down to beneath the skin  No purulence  Skin healing well  Nailbed healing well    Physical Exam   Constitutional: She is oriented to person, place, and time  She appears well-developed and well-nourished  HENT:   Head: Normocephalic and atraumatic  Eyes: Conjunctivae are normal  Right eye exhibits no discharge  Left eye exhibits no discharge  Neck: Normal range of motion  Neck supple  Cardiovascular: Normal rate and intact distal pulses  Pulmonary/Chest: Effort normal  No respiratory distress  Musculoskeletal:   As noted in HPI   Neurological: She is alert and oriented to person, place, and time  Skin: Skin is warm and dry  Psychiatric: She has a normal mood and affect  Her behavior is normal  Judgment and thought content normal        I have personally reviewed pertinent films in PACS and my interpretation is as follows:X-ray left ring finger performed in the office today demonstrates subluxation DIP joint with volar subluxation of p3  Appears that the dorsal base of p3 has resorbed

## 2023-05-08 ENCOUNTER — OFFICE VISIT (OUTPATIENT)
Dept: NEUROLOGY | Facility: CLINIC | Age: 78
End: 2023-05-08
Payer: MEDICARE

## 2023-05-08 VITALS
BODY MASS INDEX: 18.18 KG/M2 | DIASTOLIC BLOOD PRESSURE: 56 MMHG | HEIGHT: 62 IN | RESPIRATION RATE: 10 BRPM | HEART RATE: 80 BPM | SYSTOLIC BLOOD PRESSURE: 98 MMHG | OXYGEN SATURATION: 96 % | WEIGHT: 98.8 LBS

## 2023-05-08 DIAGNOSIS — F03.90 DEMENTIA WITHOUT BEHAVIORAL DISTURBANCE: Primary | ICD-10-CM

## 2023-05-08 PROCEDURE — 1160F RVW MEDS BY RX/DR IN RCRD: CPT | Performed by: STUDENT IN AN ORGANIZED HEALTH CARE EDUCATION/TRAINING PROGRAM

## 2023-05-08 PROCEDURE — 99204 OFFICE O/P NEW MOD 45 MIN: CPT | Performed by: STUDENT IN AN ORGANIZED HEALTH CARE EDUCATION/TRAINING PROGRAM

## 2023-05-08 PROCEDURE — 1159F MED LIST DOCD IN RCRD: CPT | Performed by: STUDENT IN AN ORGANIZED HEALTH CARE EDUCATION/TRAINING PROGRAM

## 2023-05-08 RX ORDER — MEMANTINE HYDROCHLORIDE 10 MG/1
10 TABLET ORAL 2 TIMES DAILY
Qty: 60 TABLET | Refills: 4 | Status: SHIPPED | OUTPATIENT
Start: 2023-05-29 | End: 2023-10-26

## 2023-05-08 RX ORDER — MEMANTINE HYDROCHLORIDE 5 MG/1
TABLET ORAL
Qty: 42 TABLET | Refills: 0 | Status: SHIPPED | OUTPATIENT
Start: 2023-05-08

## 2023-05-08 NOTE — PROGRESS NOTES
"Chief Complaint   Patient presents with   • Memory Loss       Patient ID: Tricia Malone is a 78 y.o. female.    HPI:    Ms. Malone is a 78-year-old female who presents to neurology clinic for evaluation of memory problems. She was referred by JASON Ballesteros. I reviewed the note from 02/2023. She was prescribed donepezil. She was planning to restart donepezil, but she reports today that she is not taking it. She lives on her own. Her son checks in on her every day. She reportedly had a SLUMS test done in 03/2023. She scored 6 out of 30.    Her son has noticed memory troubles with her. He states that she has trouble with short-term memory. He notes that she forgets really easily. He states that she wanders at night a lot, and she does not get into sleep. He states that she \"will go out the door on her, and this has happened a couple of times.\" He reports that he does not want her to get hurt. She states that she does not get to go outside. Her son elaborates that she can go outside anytime she wants, but they \"do not want her going down the middle of the street in the rain, and not know where she is at because she can get lost, and can not find her.\" Her son reports that this has been going on for 3 to 4 years. Her son states that \"he did not see anything at first because he did not know what he was looking for, but when he started putting pieces together, she did not realize it herself.\" The patient reports that her mother and grandmother had memory troubles . She states that she \"treated both of them, and she kept them cleaned for them.\" The patient reports that they were in their mid 70s and 80s when they had memory troubles.    She gets 5 to 6 hours of sleep at night. She denies having trouble getting to sleep. Her son states that she will wander, and her mind is racing a mile a minute. He states that she will get to the point where she is extremely exhausted, and she can not go on anymore. He states that she " will sit on the couch until she can not go on anymore. He states that she is afraid that someone is going to come in the house and through the windows.                     She is able to get up out of bed, walk to the restroom, and take care of her own hygiene. She is able to get dressed by herself. She confirms that she remembers to eat meals. Her son states that she is a snacker.    Her son confirms that he takes care of her finances. She states that he trusts him, and he sees her once a day. His older brother stays with her 24/7.    She states that she stopped driving 7 or 8 months ago.     The patient confirms that she has been doing housework, yard work, and laundry. Her son states that he cleans her house, and he keeps it very tight in the house. She can cook, but not very much. Her son states that he cooks her meals. Her son reports that she snacks on a box of macaroni and cheese.  The patient denies smoking cigarettes. She denies a history of high blood pressure or high blood sugar. Her son states that Dr. Alexandre prescribed donepezil, but she stopped taking it because she experienced dizziness. Her son states that she is able to dress herself, remember to eat, and remember hygiene without any difficulty. The patient's son states that prior to his brother moving in, she had been there the whole time by herself. She has started moving and leaving the house. The patient states that she thought she had dementia, but she forgot what she was going to say. She reports that she enjoys talking to people.    The patient confirms that her goal is to stay at home as long as she can. Her mother and grandmother stay at home when they had memory troubles. The patient's son states that they can not afford assisted living or memory care. She confirms that she smoked cigarettes for a long time, and she stopped 10 to 12 years ago.    The patient's family history in her mother and grandmother developing Alzheimer's above age  76-year-old.      The following portions of the patient's history were reviewed and updated as appropriate: allergies, current medications, past family history, past medical history, past social history, past surgical history and problem list.    Review of Systems   Neurological: Positive for dizziness. Negative for tremors, seizures, syncope, facial asymmetry, speech difficulty, weakness, light-headedness, numbness and headaches.   Psychiatric/Behavioral: Positive for agitation, confusion, hallucinations and sleep disturbance. Negative for behavioral problems, decreased concentration, dysphoric mood, self-injury and suicidal ideas. The patient is not nervous/anxious and is not hyperactive.           Vitals:    05/08/23 1432   BP: 98/56   Pulse: 80   Resp: 10   SpO2: 96%       Neurologic Exam     Mental Status   SLUMS approx 6/30 -unable to tell me what day of the week it is.  Not oriented to year.  Oriented to [place].  0 out of 5 delayed recall.  Unable to do initial repeats which is not scored but noted today.  Only 2 out of 5 delayed recall.  Unable to add or subtract.  When given 1 minute to name as many animals as she can in 1 minute. Dog cat horse fish snake. Repeats horse two more times, fish once. 0/5 recall. Gets 3 and 4 digit span in reverse correct. Can identify triangle and largest shape.       Physical Exam    Procedures    The patient had a CT of her head done in 02/2023 with age-appropriate volume loss and findings of white matter disease. They reportedly scattered, though I can not see it in the system as it was done at an outside facility.    She had a B12 done in 12/2023. Her B12s have been consistently below 500, last measured at 381 5 months ago. I do not see a recent thyroid hormone, but she had one in 2019 that was normal.    Assessment/Plan:      1. Memory loss  - The patient's family history is notable for her mother and grandmother developing Alzheimer's above age 76-year-old. Memantine  was prescribed for chronic condition, which is worsening of dementia. I decided to hold off on imaging at this point in time. She already had a CT scan. My concern is over Alzheimer's dementia given her impaired executive function and short-term memory today as well as her tangential speech during the visit.  Instructions were given to increase this dose to 10 mg twice daily.  In the future an alternative cholinesterase inhibitor besides donepezil could be considered in case it could be tolerated better with less side effects.  She has on testing concern for severe dementia but per report from family she has no impairment of basic ADLs.  This is confusing as her testing seems to imply significant impairment.  My concern is over dementia with her testing today.    2. Vitamin B12 deficiency  - She will start taking vitamin B12 supplements. We will recheck her vitamin B12 level at her next appointment.    She will follow-up in 5 months.          Diagnoses and all orders for this visit:    1. Dementia without behavioral disturbance (Primary)    Other orders  -     cyanocobalamin ([facility] Vitamin B-12) 1000 MCG tablet; Take 1 tablet by mouth Daily.  Dispense: 100 tablet; Refill: 2  -     memantine (NAMENDA) 5 MG tablet; Week 1 take 5 mg nightly, week 2 take 5 mg twice a day, week 3 take 5mg in the AM and 10mg in the PM  Dispense: 42 tablet; Refill: 0  -     memantine (NAMENDA) 10 MG tablet; Take 1 tablet by mouth 2 (Two) Times a Day for 150 days.  Dispense: 60 tablet; Refill: 4           Clifford Brito MD      Transcribed from ambient dictation for Clifford Brito MD by Ani iHnton.  05/08/23   19:03 EDT    Patient or patient representative verbalized consent to the visit recording.  I have personally performed the services described in this document as transcribed by the above individual, and it is both accurate and complete.  Clifford Brito MD  5/23/2023  18:15 EDT

## 2023-05-08 NOTE — LETTER
"May 23, 2023       No Recipients    Patient: Tricia Malone   YOB: 1945   Date of Visit: 5/8/2023       Dear Dr. Chambers Recipients:    Thank you for referring Tricia Malone to me for evaluation. Below are the relevant portions of my assessment and plan of care.    If you have questions, please do not hesitate to call me. I look forward to following Tricia along with you.         Sincerely,        Clifford Brito MD        CC:   No Recipients    Clifford Brito MD  05/23/23 0666  Incomplete  Chief Complaint   Patient presents with    Memory Loss       Patient ID: Tricia Malone is a 78 y.o. female.    HPI:    Ms. Malone is a 78-year-old female who presents to neurology clinic for evaluation of memory problems. She was referred by JASON Ballesteros. I reviewed the note from 02/2023. She was prescribed donepezil. She was planning to restart donepezil, but she reports today that she is not taking it. She lives on her own. Her son checks in on her every day. She reportedly had a SLUMS test done in 03/2023. She scored 6 out of 30.    Her son has noticed memory troubles with her. He states that she has trouble with short-term memory. He notes that she forgets really easily. He states that she wanders at night a lot, and she does not get into sleep. He states that she \"will go out the door on her, and this has happened a couple of times.\" He reports that he does not want her to get hurt. She states that she does not get to go outside. Her son elaborates that she can go outside anytime she wants, but they \"do not want her going down the middle of the street in the rain, and not know where she is at because she can get lost, and can not find her.\" Her son reports that this has been going on for 3 to 4 years. Her son states that \"he did not see anything at first because he did not know what he was looking for, but when he started putting pieces together, she did not realize it herself.\" The patient reports that her mother and " "grandmother had memory troubles . She states that she \"treated both of them, and she kept them cleaned for them.\" The patient reports that they were in their mid 70s and 80s when they had memory troubles.    She gets 5 to 6 hours of sleep at night. She denies having trouble getting to sleep. Her son states that she will wander, and her mind is racing a mile a minute. He states that she will get to the point where she is extremely exhausted, and she can not go on anymore. He states that she will sit on the couch until she can not go on anymore. He states that she is afraid that someone is going to come in the house and through the windows.                     She is able to get up out of bed, walk to the restroom, and take care of her own hygiene. She is able to get dressed by herself. She confirms that she remembers to eat meals. Her son states that she is a snacker.    Her son confirms that he takes care of her finances. She states that he trusts him, and he sees her once a day. His older brother stays with her 24/7.    She states that she stopped driving 7 or 8 months ago.     The patient confirms that she has been doing housework, yard work, and laundry. Her son states that he cleans her house, and he keeps it very tight in the house. She can cook, but not very much. Her son states that he cooks her meals. Her son reports that she snacks on a box of macaroni and cheese. The patient's son states that he used a smoke detector the other day. The patient denies smoking cigarettes. She denies a history of high blood pressure or high blood sugar. Her son states that Dr. Alexandre prescribed donepezil, but she stopped taking it because she experienced dizziness. Her son states that she is able to dress herself, remember to eat, and remember hygiene without any difficulty. The patient's son states that prior to his brother moving in, she had been there the whole time by herself. She has started moving and leaving the house. " The patient states that she thought she had dementia, but she forgot what she was going to say. She reports that she enjoys talking to people.    The patient confirms that her goal is to stay at home as long as she can. Her mother and grandmother stay at home when they had memory troubles. The patient's son states that they can not afford assisted living or memory care. She confirms that she smoked cigarettes for a long time, and she stopped 10 to 12 years ago.    The patient's family history in her mother and grandmother developing Alzheimer's above age 76-year-old.      The following portions of the patient's history were reviewed and updated as appropriate: allergies, current medications, past family history, past medical history, past social history, past surgical history and problem list.    Review of Systems   Neurological: Positive for dizziness. Negative for tremors, seizures, syncope, facial asymmetry, speech difficulty, weakness, light-headedness, numbness and headaches.   Psychiatric/Behavioral: Positive for agitation, confusion, hallucinations and sleep disturbance. Negative for behavioral problems, decreased concentration, dysphoric mood, self-injury and suicidal ideas. The patient is not nervous/anxious and is not hyperactive.           Vitals:    05/08/23 1432   BP: 98/56   Pulse: 80   Resp: 10   SpO2: 96%       Neurologic Exam     Mental Status   SLUMS approx 6/30 -unable to tell me what day of the week it is.  Not oriented to year.  Oriented to [place].  0 out of 5 delayed recall.  Unable to do initial repeats which is not scored but noted today.  Only 2 out of 5 delayed recall.  Unable to add or subtract.  When given 1 minute to name as many animals as she can in 1 minute. Dog cat horse fish snake. Repeats horse two more times, fish once. 0/5 recall. Gets 3 and 4 digit span in reverse correct. Can identify triangle and largest shape.       Physical Exam    Procedures    The patient had a CT of her  head done in 02/2023 with age-appropriate volume loss and findings of white matter disease. They reportedly scattered, though I can not see it in the system as it was done at an outside facility.    She had a B12 done in 12/2023. Her B12s have been consistently below 500, last measured at 381 5 months ago. I do not see a recent thyroid hormone, but she had one in 2019 that was normal.    Assessment/Plan:      1. Memory loss  - The patient's family history in her mother and grandmother developing Alzheimer's above age 76-year-old. Memantine was prescribed for chronic condition, which is worsening of dementia. I decided to hold off on imaging at this point in time. She already had a CT scan. My concern is over Alzheimer's dementia given her impaired executive function and short-term memory today as well as her tangential speech during the visit.  Instructions were given to increase this dose to 10 mg twice daily.  In the future an alternative cholinesterase inhibitor could be considered in case it could be tolerated better with less side effects.    2. Vitamin B12 deficiency  - She will start taking vitamin B12 supplements. We will recheck her vitamin B12 level at her next appointment.    She will follow-up in 5 months.         Diagnoses and all orders for this visit:    1. Dementia without behavioral disturbance (Primary)    Other orders  -     cyanocobalamin ([facility] Vitamin B-12) 1000 MCG tablet; Take 1 tablet by mouth Daily.  Dispense: 100 tablet; Refill: 2  -     memantine (NAMENDA) 5 MG tablet; Week 1 take 5 mg nightly, week 2 take 5 mg twice a day, week 3 take 5mg in the AM and 10mg in the PM  Dispense: 42 tablet; Refill: 0  -     memantine (NAMENDA) 10 MG tablet; Take 1 tablet by mouth 2 (Two) Times a Day for 150 days.  Dispense: 60 tablet; Refill: 4          Clifford Brito MD      Transcribed from ambient dictation for Clifford Brito MD by Ani Hinton.  05/08/23   19:03 EDT    Patient or patient representative  verbalized consent to the visit recording.  I have personally performed the services described in this document as transcribed by the above individual, and it is both accurate and complete.  Clifford Brito MD  5/23/2023  18:15 Ani Mejias  05/08/23 1939  Sign when Signing Visit  Chief Complaint   Patient presents with    Memory Loss       Patient ID: Tricia Malone is a 78 y.o. female.    HPI:    Ms. Malone is a 78-year-old female who presents to neurology clinic for evaluation of memory problems. She was referred by JASON Ballesteros. I reviewed the note from 02/2023. She was prescribed donepezil. She was planning to restart donepezil, but she reports today that she is not taking it. She lives on her own. Her son checks in on her every day. She reportedly had a SLUMS test done in 03/2023. She scored 6 out of 30 repeatedly.    She confirms that she is able to take care of herself. She denies using a calendar. She notes that she will ask people if she wanted to, but she does not have a calendar. She states that she tries to listen to someone other than not knowing. The patient reports that she did not pass [employer], but she tries to hold it long enough until someone else does it. She states that she does not remember year it is. The patient reports that she is currently in [place]. She states that she spends $100 when she went to the store and purchased a dozen apples for 3 dollars and a tricycle for 20 dollars. She notes that she does not remember any of the 5 words that she was asked to remember. The patient reports that she does not remember any of the 5 words that I repeated. She states that she will write things down. The patient reports that she has 2 or 3 of her friends who have the same memory problems. She states that they understand her, but if she walks up to ask them, it is embarrassing. She has not seen her friend in 2 or 3 days.    Her son has noticed memory troubles with her. He states that she  "has trouble with short-term memory. He notes that she forgets really easily. He states that she wanders at night a lot, and she does not get into sleep. He states that she \"will go out the door on her, and this has happened a couple of times.\" He reports that he does not want her to get hurt. She states that she does not get to go outside. Her son elaborates that she can go outside anytime she wants, but they \"do not want her going down the middle of the street in the rain, and not know where she is at because she can get lost, and can not find her.\" Her son reports that this has been going on for 3 to 4 years. Her son states that \"he did not see anything at first because he did not know what he was looking for, but when he started putting pieces together, she did not realize it herself.\" The patient reports that her mother and grandmother had memory troubles . She states that she \"treated both of them, and she kept them cleaned for them.\" The patient reports that they were in their mid 70s and 80s when they had memory troubles.    She gets 5 to 6 hours of sleep at night. She denies having trouble getting to sleep. Her son states that she will wander, and her mind is racing a mile a minute. He states that she will get to the point where she is extremely exhausted, and she can not go on anymore. He states that she will sit on the couch until she can not go on anymore. He states that she is afraid that someone is going to come in the house and through the windows.                     She is able to get up out of bed, walk to the restroom, and take care of her own hygiene. She is able to get dressed by herself. She confirms that she remembers to eat meals. Her son states that she is a snacker, and she loves a jumper. He states that his wife is a good cook.    Her son confirms that he takes care of her finances. She states that he trusts him, and he sees her once a day. His older brother stays with her 24/7.    She " states that she stopped driving 7 or 8 months ago.     The patient confirms that she has been doing housework, yard work, and laundry. Her son states that he cleans her house, and he keeps it very tight in the house. She can cook, but not very much. Her son states that he cooks her meals. Her son reports that she snacks on a box of macaroni and cheese. The patient's son states that he used a smoke detector the other day. The patient denies smoking cigarettes. She denies a history of high blood pressure or high blood sugar. Her son states that Dr. Alexandre prescribed donepezil, but she stopped taking it because she experienced dizziness. The patient's son states that Dr. Alexandre prescribed Aricept, but it made her very dizzy. Her son states that she is able to dress herself, remember to eat, and remember hygiene without any difficulty. The patient's son states that prior to his brother moving in, she had been there the whole time by herself. She has started moving and leaving the house. The patient states that she thought she had dementia, but she forgot what she was going to say. She reports that she enjoys talking to people.    The patient confirms that her goal is to stay at home as long as she can. Her mother and grandmother stay at home when they had memory troubles. The patient's son states that they can not afford assisted living or memory care. She confirms that she has power of . The patient states that she does not have any money at all times. She confirms that she smoked cigarettes for a long time, and she stopped 10 to 12 years ago.    The patient's family history in her mother and grandmother developing Alzheimer's above age 76-year-old.      The following portions of the patient's history were reviewed and updated as appropriate: allergies, current medications, past family history, past medical history, past social history, past surgical history and problem list.    Review of Systems   Neurological:  "Positive for dizziness. Negative for tremors, seizures, syncope, facial asymmetry, speech difficulty, weakness, light-headedness, numbness and headaches.   Psychiatric/Behavioral: Positive for agitation, confusion, hallucinations and sleep disturbance. Negative for behavioral problems, decreased concentration, dysphoric mood, self-injury and suicidal ideas. The patient is not nervous/anxious and is not hyperactive.       ***    Vitals:    05/08/23 1432   BP: 98/56   Pulse: 80   Resp: 10   SpO2: 96%       Neurologic Exam    Physical Exam    Procedures    The patient had a CT of her head done in 02/2023 with age-appropriate volume loss and findings of white matter disease. They reportedly scattered, though I can not see it in the system as it was done at an outside facility.    She had a B12 done in 12/2023. Her B12 S have been consistently below 500, last measured at 381 5 months ago. I do not see a recent thyroid hormone, but she had one in 2019 that was normal.    Assessment/Plan:      1. Memory loss  - The patient's family history in her mother and grandmother developing Alzheimer's above age 76-year-old. Memantine was prescribed for chronic condition, which is worsening of dementia. I decided to hold off on imaging at this point in time. She already had a CT scan and she can simply treat signs of vascular dementia. My concern is over Alzheimer's dementia given her impaired executive function and short-term memory today as well as her tangential speech during the visit.    2. Vitamin B12 deficiency  - She will start taking vitamin B12 supplements. We will recheck her vitamin B12 level at her next appointment.    She will follow-up in 5 months.         There are no diagnoses linked to this encounter.      Garett Salmon MA      Transcribed from ambient dictation for Clifford Brito MD by Ani Hinton.  05/08/23   19:03 EDT    {CHARISSA Provider Statement:[id number]::\"Patient or patient representative verbalized consent to " "the visit recording.\",\"I have personally performed the services described in this document as transcribed by the above individual, and it is both accurate and complete.\"}        "

## 2023-05-17 DIAGNOSIS — F03.918 DEMENTIA, OLD AGE, WITH BEHAVIORAL DISTURBANCE: ICD-10-CM

## 2023-05-17 DIAGNOSIS — R41.3 MEMORY PROBLEM: ICD-10-CM

## 2023-05-17 RX ORDER — DONEPEZIL HYDROCHLORIDE 5 MG/1
5 TABLET, FILM COATED ORAL NIGHTLY
Qty: 90 TABLET | Refills: 0 | Status: SHIPPED | OUTPATIENT
Start: 2023-05-17

## 2023-05-30 RX ORDER — MEMANTINE HYDROCHLORIDE 5 MG/1
TABLET ORAL
Qty: 42 TABLET | Refills: 0 | OUTPATIENT
Start: 2023-05-30

## 2023-08-12 DIAGNOSIS — R41.3 MEMORY PROBLEM: ICD-10-CM

## 2023-08-12 DIAGNOSIS — F03.918 DEMENTIA, OLD AGE, WITH BEHAVIORAL DISTURBANCE: ICD-10-CM

## 2023-08-14 RX ORDER — DONEPEZIL HYDROCHLORIDE 5 MG/1
TABLET, FILM COATED ORAL
Qty: 90 TABLET | Refills: 0 | OUTPATIENT
Start: 2023-08-14